# Patient Record
Sex: FEMALE
[De-identification: names, ages, dates, MRNs, and addresses within clinical notes are randomized per-mention and may not be internally consistent; named-entity substitution may affect disease eponyms.]

---

## 2018-03-06 ENCOUNTER — HOSPITAL ENCOUNTER (EMERGENCY)
Dept: HOSPITAL 31 - C.ER | Age: 53
Discharge: HOME | End: 2018-03-06
Payer: COMMERCIAL

## 2018-03-06 VITALS — SYSTOLIC BLOOD PRESSURE: 120 MMHG | HEART RATE: 58 BPM | DIASTOLIC BLOOD PRESSURE: 76 MMHG

## 2018-03-06 VITALS — TEMPERATURE: 98.1 F | RESPIRATION RATE: 18 BRPM | OXYGEN SATURATION: 97 %
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
## 2018-03-06 DIAGNOSIS — S30.0XXA: Primary | ICD-10-CM

## 2018-03-06 NOTE — RAD
PROCEDURE:  Radiographs of the Chest and Left Ribs.



HISTORY:

trauma



COMPARISON:

None available. 



TECHNIQUE:

Frontal radiograph of the chest and multiple oblique radiographs of 

the left ribs were obtained.



FINDINGS:



LEFT RIBS:

No fracture or focal lesion visualized.



LUNGS:

Clear.



PLEURA:

No pneumothorax or pleural fluid.



CARDIOVASCULAR:

 No radiographic findings to suggest acute or significant 

cardiovascular disease.



OTHER FINDINGS:

None.



IMPRESSION:

Unremarkable radiographs of the chest and left ribs. No left rib 

fracture.

## 2018-03-06 NOTE — CT
PROCEDURE:  CT Abdomen and Pelvis without intravenous contrast



HISTORY:

left flank pain



COMPARISON:

None.



TECHNIQUE:

CT scan of the abdomen and pelvis was performed without 

administration of intravenous contrast.  Oral contrast was not 

administered. Coronal and sagittal reformatted images were obtained 



Radiation dose:



Total exam DLP = 1197.74 mGy-cm.



This CT exam was performed using one or more of the following dose 

reduction techniques: Automated exposure control, adjustment of the 

mA and/or kV according to patient size, and/or use of iterative 

reconstruction technique.



FINDINGS:



LOWER THORAX:

There is linear atelectasis/ scarring in the right middle lobe, 

lingula and left lung base. 



LIVER:

The liver is normal in size. No gross lesion or ductal dilatation.  



GALLBLADDER AND BILE DUCTS:

No calcified gallstones. 



PANCREAS:

Normal in size. No gross lesion or ductal dilatation.



SPLEEN:

Normal in size. 



ADRENALS:

No discrete nodule. 



KIDNEYS AND URETERS:

The kidneys are normal in size, left larger than right. No 

hydronephrosis or nephrolithiasis. 



VASCULATURE:

The aorta is tortuous and there are atherosclerotic aortoiliac 

calcifications. No aortic aneurysm. 



BOWEL:

The small bowel loops are normal in caliber.  There is large amount 

of stool in the colon. No bowel dilatation or obstruction. 



APPENDIX:

Normal appendix. 



PERITONEUM:

No free fluid. No free air. 



LYMPH NODES:

Unremarkable. No enlarged lymph nodes. 



BLADDER:

Decompressed. 



REPRODUCTIVE:

The uterus is normal in size. 



BONES:

No acute fracture. Within normal limits for the patient's age. 



OTHER FINDINGS:

There is a small sliding hiatal hernia.



IMPRESSION:

No evidence of nephrolithiasis, hydronephrosis or obstructive 

uropathy.



Please note pyelonephritis cannot be excluded in the absence of 

intravenous contrast.

## 2018-03-06 NOTE — C.PDOC
History Of Present Illness


52-year-old female, presents to the emergency department with complaints of 

pain to left flank after mechanical fall three days ago. Patient notes she was 

changing the shower curtain in her bathroom, and slipped, causing her to fall 

and hit the tub. Pain is persistent, and worse with movement. Patient denies 

hematuria, change in appetite or difficulty breathing. Nothing taken for pain 

at home today.


Time Seen by Provider: 03/06/18 11:11


Chief Complaint (Nursing): Hip Pain


History Per: Patient


History/Exam Limitations: no limitations


Onset/Duration Of Symptoms: Days


Current Symptoms Are (Timing): Still Present


Severity: Moderate





Past Medical History


Reviewed: Historical Data, Nursing Documentation, Vital Signs


Vital Signs: 


 Last Vital Signs











Temp  98.1 F   03/06/18 13:28


 


Pulse  58 L  03/06/18 13:28


 


Resp  18   03/06/18 13:28


 


BP  120/76   03/06/18 13:28


 


Pulse Ox  97   03/06/18 13:40














- Medical History


PMH: HTN (not medicated, does not know regular BP)


Family History: States: No Known Family Hx





- Social History


Hx Alcohol Use: Yes


Hx Substance Use: No





- Immunization History


Hx Tetanus Toxoid Vaccination: Yes


Hx Influenza Vaccination: No


Hx Pneumococcal Vaccination: No





Review Of Systems


Constitutional: Negative for: Fever


Cardiovascular: Negative for: Chest Pain


Respiratory: Negative for: Cough, Shortness of Breath


Gastrointestinal: Negative for: Vomiting, Abdominal Pain


Genitourinary: Negative for: Hematuria


Musculoskeletal: Positive for: Back Pain


Neurological: Negative for: Weakness, Numbness, Headache, Dizziness





Physical Exam





- Physical Exam


Appears: Non-toxic, No Acute Distress


Skin: Warm, Dry, No Rash


Head: Normacephalic


Eye(s): bilateral: Normal Inspection, EOMI


Nose: Normal


Oral Mucosa: Moist


Neck: Normal ROM, Supple


Chest: Symmetrical, Tenderness ((+) left poster inferior rib tenderness)


Cardiovascular: Rhythm Regular


Respiratory: Normal Breath Sounds (equal sounds B/L), No Accessory Muscle Use


Gastrointestinal/Abdominal: Soft, No Tenderness


Back: Other (Left flank: (+)echymosis (+)tenderness)


Extremity: Normal ROM, Capillary Refill (<2 seconds), No Deformity, No Swelling


Neurological/Psych: Oriented x3, Normal Speech





ED Course And Treatment


O2 Sat by Pulse Oximetry: 97 (RA)


Pulse Ox Interpretation: Normal





- CT Scan/US


  ** CT ABD/PEL


Other Rad Studies (CT/US): Read By Radiologist, Radiology Report Reviewed


CT/US Interpretation: Accession No. : [id number].  Patient Name / ID : 

ROYAL RODRIGUEZ  / [id number].  Exam Date : 03/06/2018 12:27:18 ( Approved ).  

Study Comment :  Sex / Age : F  / 52Y.  Creator : Marzena Lawson MD.  

Dictator : Marzena Lawson MD.   :  Approver : Marzena Lawson MD.  Approver2 :  Report Date : 03/06/2018 13:19:04.  My Comment :  ************

***********************************************************************.  

PROCEDURE:  CT Abdomen and Pelvis without intravenous contrast.  HISTORY:  left 

flank pain.  COMPARISON:  None.  TECHNIQUE:  CT scan of the abdomen and pelvis 

was performed without administration of intravenous contrast.  Oral contrast 

was not administered. Coronal and sagittal reformatted images were obtained.  

Radiation dose:  Total exam DLP = 1197.74 mGy-cm.  This CT exam was performed 

using one or more of the following dose reduction techniques: Automated 

exposure control, adjustment of the mA and/or kV according to patient size, and/

or use of iterative reconstruction technique.  FINDINGS:  LOWER THORAX:  There 

is linear atelectasis/ scarring in the right middle lobe, lingula and left lung 

base.  LIVER:  The liver is normal in size. No gross lesion or ductal 

dilatation.  GALLBLADDER AND BILE DUCTS:  No calcified gallstones.  PANCREAS:  

Normal in size. No gross lesion or ductal dilatation.  SPLEEN:  Normal in size.

  ADRENALS:  No discrete nodule.  KIDNEYS AND URETERS:  The kidneys are normal 

in size, left larger than right. No hydronephrosis or nephrolithiasis.  

VASCULATURE:  The aorta is tortuous and there are atherosclerotic aortoiliac 

calcifications. No aortic aneurysm.  BOWEL:  The small bowel loops are normal 

in caliber.  There is large amount of stool in the colon. No bowel dilatation 

or obstruction.  APPENDIX:  Normal appendix.  PERITONEUM:  No free fluid. No 

free air.  LYMPH NODES:  Unremarkable. No enlarged lymph nodes.  BLADDER:  

Decompressed.  REPRODUCTIVE:  The uterus is normal in size.  BONES:  No acute 

fracture. Within normal limits for the patient's age.  OTHER FINDINGS:  There 

is a small sliding hiatal hernia.  IMPRESSION:  No evidence of nephrolithiasis, 

hydronephrosis or obstructive uropathy.  Please note pyelonephritis cannot be 

excluded in the absence of intravenous contrast.


Progress Note: CT Abd/Pel, XR Ribs ordered and reviewed. Patient treated with 

Tramadol for pain.  On re-evaluation, pain has improved. No chest pain. 

TOlerating PO. Pt was instructed RICE and follow up with PMD in 1-2 days.





Disposition





- Disposition


Disposition: HOME/ ROUTINE


Disposition Time: 13:30


Condition: STABLE


Additional Instructions: 


Follow up with your primary medical doctor or clinic in 2-5 days for further 

evaluation. Take medications as prescribed. Return to the emergency department 

at any time if symptoms persist or worsen.


Prescriptions: 


traMADol [Ultram] 50 mg PO Q8 #15 tab


Instructions:  Contusion (DC)


Forms:  "[employer] (English), Work Excuse





- Clinical Impression


Clinical Impression: 


 Back contusion








- Scribe Statement


The provider has reviewed the documentation as recorded by the Scribe (Meño [email or web address])








All medical record entries made by the Scribe were at my direction and 

personally dictated by me. I have reviewed the chart and agree that the record 

accurately reflects my personal performance of the history, physical exam, 

medical decision making, and the department course for this patient. I have 

also personally directed, reviewed, and agree with the discharge instructions 

and disposition.